# Patient Record
Sex: FEMALE | Race: WHITE | NOT HISPANIC OR LATINO | Employment: OTHER | URBAN - NONMETROPOLITAN AREA
[De-identification: names, ages, dates, MRNs, and addresses within clinical notes are randomized per-mention and may not be internally consistent; named-entity substitution may affect disease eponyms.]

---

## 2022-02-28 ENCOUNTER — HOSPITAL ENCOUNTER (OUTPATIENT)
Dept: ULTRASOUND IMAGING | Facility: HOSPITAL | Age: 67
Discharge: HOME | End: 2022-02-28
Admitting: NURSE PRACTITIONER

## 2022-02-28 ENCOUNTER — TRANSCRIBE ORDERS (OUTPATIENT)
Dept: ADMINISTRATIVE | Facility: HOSPITAL | Age: 67
End: 2022-02-28

## 2022-02-28 DIAGNOSIS — I82.402 ACUTE EMBOLISM AND THROMBOSIS OF DEEP VEIN OF LEFT LOWER EXTREMITY: ICD-10-CM

## 2022-02-28 DIAGNOSIS — I82.402 ACUTE EMBOLISM AND THROMBOSIS OF DEEP VEIN OF LEFT LOWER EXTREMITY: Primary | ICD-10-CM

## 2022-02-28 PROCEDURE — 93971 EXTREMITY STUDY: CPT

## 2022-03-25 ENCOUNTER — OFFICE VISIT (OUTPATIENT)
Dept: NEUROSURGERY | Age: 67
End: 2022-03-25
Payer: COMMERCIAL

## 2022-03-25 VITALS
WEIGHT: 170 LBS | BODY MASS INDEX: 29.02 KG/M2 | HEIGHT: 64 IN | DIASTOLIC BLOOD PRESSURE: 75 MMHG | SYSTOLIC BLOOD PRESSURE: 138 MMHG | HEART RATE: 80 BPM

## 2022-03-25 DIAGNOSIS — R42 LIGHTHEADEDNESS: ICD-10-CM

## 2022-03-25 DIAGNOSIS — G44.309 POST-CONCUSSION HEADACHE: Primary | ICD-10-CM

## 2022-03-25 PROCEDURE — 99204 OFFICE O/P NEW MOD 45 MIN: CPT | Performed by: NURSE PRACTITIONER

## 2022-03-25 PROCEDURE — 80305 DRUG TEST PRSMV DIR OPT OBS: CPT | Performed by: NURSE PRACTITIONER

## 2022-03-25 RX ORDER — OMEGA-3 FATTY ACIDS/FISH OIL 300-1000MG
1 CAPSULE ORAL
COMMUNITY

## 2022-03-25 RX ORDER — AZELASTINE 1 MG/ML
1 SPRAY, METERED NASAL 2 TIMES DAILY
COMMUNITY

## 2022-03-25 RX ORDER — MONTELUKAST SODIUM 10 MG/1
10 TABLET ORAL NIGHTLY
COMMUNITY

## 2022-03-25 RX ORDER — OMEPRAZOLE 20 MG/1
20 CAPSULE, DELAYED RELEASE ORAL DAILY
COMMUNITY

## 2022-03-25 RX ORDER — NORTRIPTYLINE HYDROCHLORIDE 25 MG/1
25 CAPSULE ORAL NIGHTLY
Qty: 30 CAPSULE | Refills: 3 | Status: SHIPPED | OUTPATIENT
Start: 2022-03-25 | End: 2022-05-25 | Stop reason: SINTOL

## 2022-03-25 RX ORDER — POTASSIUM CITRATE 10 MEQ/1
20 TABLET, EXTENDED RELEASE ORAL DAILY
COMMUNITY

## 2022-03-25 RX ORDER — LOSARTAN POTASSIUM AND HYDROCHLOROTHIAZIDE 12.5; 5 MG/1; MG/1
1 TABLET ORAL DAILY
COMMUNITY

## 2022-03-25 RX ORDER — ASPIRIN 81 MG/1
81 TABLET, CHEWABLE ORAL DAILY
COMMUNITY
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
## 2022-03-31 ENCOUNTER — TELEPHONE (OUTPATIENT)
Dept: NEUROSURGERY | Age: 67
End: 2022-03-31

## 2022-03-31 ENCOUNTER — TELEPHONE (OUTPATIENT)
Dept: NEUROLOGY | Age: 67
End: 2022-03-31

## 2022-03-31 ENCOUNTER — HOSPITAL ENCOUNTER (OUTPATIENT)
Dept: NON INVASIVE DIAGNOSTICS | Age: 67
Discharge: HOME | End: 2022-03-31
Payer: COMMERCIAL

## 2022-03-31 DIAGNOSIS — R93.1 ABNORMAL ECHOCARDIOGRAM: Primary | ICD-10-CM

## 2022-03-31 DIAGNOSIS — R42 LIGHTHEADEDNESS: ICD-10-CM

## 2022-03-31 LAB
LV EF: 58 %
LVEF MODALITY: NORMAL

## 2022-03-31 PROCEDURE — 93306 TTE W/DOPPLER COMPLETE: CPT

## 2022-03-31 PROCEDURE — 93246 EXT ECG>7D<15D RECORDING: CPT

## 2022-03-31 NOTE — TELEPHONE ENCOUNTER
Dmitry Estrada NP from [facility] called stating that the pt had an abnormal ECHO today and Dr. Ortiz Rodas is recommending a CT be ordered.

## 2022-03-31 NOTE — TELEPHONE ENCOUNTER
----- Message from NORA Ngo sent at 3/31/2022 12:50 PM CDT -----  CTA chest to r/o PE per cardiology. Defer cardiac MRI to cardiology, these are not done locally and if it is needed then should come from cardiology. Called pt explained her Echo was abnormal with a concern of thrombosis, CTA Chest was ordered per EG and scheduled for 4/1/2022 at 1520 with an arrival time of 1500. Instructed her to hold Metformin the day of procedure and fast 6 hours prior to check in time. Instructed pt to arrive at 1500 in outpatient registration with ID and insurance cards. Pt voiced understanding. Per Hope message pt asked if this was covered via [employer], I did explain it looks like the pip coverage is exhausted. Pt voiced understanding.

## 2022-04-01 ENCOUNTER — HOSPITAL ENCOUNTER (OUTPATIENT)
Dept: CT IMAGING | Age: 67
Discharge: HOME | End: 2022-04-01
Payer: COMMERCIAL

## 2022-04-01 DIAGNOSIS — R93.1 ABNORMAL ECHOCARDIOGRAM: ICD-10-CM

## 2022-04-01 LAB
GFR AFRICAN AMERICAN: >60
GFR NON-AFRICAN AMERICAN: >60
POC CREATININE: 0.9 MG/DL (ref 0.3–1.3)

## 2022-04-01 PROCEDURE — 71275 CT ANGIOGRAPHY CHEST: CPT

## 2022-04-01 PROCEDURE — 6360000004 HC RX CONTRAST MEDICATION: Performed by: NURSE PRACTITIONER

## 2022-04-01 PROCEDURE — 82565 ASSAY OF CREATININE: CPT

## 2022-04-01 RX ADMIN — IOPAMIDOL 80 ML: 755 INJECTION, SOLUTION INTRAVENOUS at 15:22

## 2022-04-11 ENCOUNTER — TELEPHONE (OUTPATIENT)
Dept: NEUROSURGERY | Age: 67
End: 2022-04-11

## 2022-04-11 DIAGNOSIS — R93.89 ABNORMAL COMPUTED TOMOGRAPHY ANGIOGRAPHY (CTA): Primary | ICD-10-CM

## 2022-04-12 ENCOUNTER — TELEPHONE (OUTPATIENT)
Dept: NEUROSURGERY | Age: 67
End: 2022-04-12

## 2022-04-12 ENCOUNTER — TELEPHONE (OUTPATIENT)
Dept: VASCULAR SURGERY | Age: 67
End: 2022-04-12

## 2022-04-12 NOTE — TELEPHONE ENCOUNTER
Fannie Trujillo requests that clinic return her call. The best time to reach her is Anytime. Fannie Trujillo would like to go over her test results and reasoning of why she is being referred to other specialists. Thank you.

## 2022-04-12 NOTE — TELEPHONE ENCOUNTER
Called patients about referrals. She voiced understanding to the results. However questioned if this is something that could wait a few months. She voiced her insurance is not the best and would like wait until September to continue with referrals as she will have medicare at that point. She stated if this was something that was urgent and needed to be seen asap she would do so but would prefer to wait until September if at all possible.

## 2022-04-12 NOTE — TELEPHONE ENCOUNTER
Arcadio Soni called to schedule a new pt appt. Please be advised that the best time to call her to accommodate their needs is Anytime. Thank you.

## 2022-04-12 NOTE — TELEPHONE ENCOUNTER
Why don't she talk with her PCP regarding the liver lesion- might be able to order an u/s to get a different look at it. We can hold off on that referral until September unless PCP feels otherwise. As far as the vascular referral, I'm not sure how significant the calcification is so I would prefer her to see vascular sooner rather later.

## 2022-04-14 PROCEDURE — 93248 EXT ECG>7D<15D REV&INTERPJ: CPT | Performed by: NURSE PRACTITIONER

## 2022-04-14 NOTE — PROCEDURES
[facility] Monitor Report      Christiano Villaseñor  [id number]    : 1955      Indications: irregular heart rate       Test Date:      Analysis Date:  2022    Date Interpreted: 2022        Nearly 8 days and 13 hours of rhythm are processed. 1. Sinus  BPM    2.  Rare VEs and SVEs    (copied from Dr. Makenna Gastelum handwritten report on Zio strips)    Electronically signed by NORA Mccoy on 22

## 2022-04-27 DIAGNOSIS — G43.009 MIGRAINE WITHOUT AURA AND WITHOUT STATUS MIGRAINOSUS, NOT INTRACTABLE: Primary | ICD-10-CM

## 2022-04-27 RX ORDER — RIMEGEPANT SULFATE 75 MG/75MG
TABLET, ORALLY DISINTEGRATING ORAL
Qty: 8 TABLET | Refills: 5 | Status: SHIPPED | OUTPATIENT
Start: 2022-04-27 | End: 2022-05-25 | Stop reason: SDUPTHER

## 2022-04-27 RX ORDER — BUTALBITAL, ACETAMINOPHEN AND CAFFEINE 50; 325; 40 MG/1; MG/1; MG/1
1 TABLET ORAL EVERY 6 HOURS PRN
Qty: 30 TABLET | Refills: 3 | Status: SHIPPED | OUTPATIENT
Start: 2022-04-27 | End: 2022-05-25 | Stop reason: SDUPTHER

## 2022-04-27 NOTE — TELEPHONE ENCOUNTER
Requested Prescriptions     Pending Prescriptions Disp Refills    Rimegepant Sulfate (NURTEC) 75 MG TBDP 8 tablet 5     Sig: Take 1 tablet as need at onset of migraine. Do not exceed 1 tablet in 24 hours.     butalbital-acetaminophen-caffeine (FIORICET, ESGIC) -40 MG per tablet 30 tablet 3     Sig: Take 1 tablet by mouth every 6 hours as needed for Migraine       Last Office Visit:  3/25/2022  Next Office Visit:  5/25/2022      *RX updated to reflect  04/27/2022  fill date*

## 2022-05-06 ENCOUNTER — TELEPHONE (OUTPATIENT)
Dept: NEUROLOGY | Age: 67
End: 2022-05-06
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
## 2022-05-25 ENCOUNTER — OFFICE VISIT (OUTPATIENT)
Dept: NEUROSURGERY | Age: 67
End: 2022-05-25
Payer: COMMERCIAL

## 2022-05-25 VITALS
DIASTOLIC BLOOD PRESSURE: 75 MMHG | SYSTOLIC BLOOD PRESSURE: 129 MMHG | TEMPERATURE: 97 F | HEIGHT: 64 IN | OXYGEN SATURATION: 97 % | HEART RATE: 72 BPM | BODY MASS INDEX: 29.18 KG/M2

## 2022-05-25 DIAGNOSIS — G43.009 MIGRAINE WITHOUT AURA AND WITHOUT STATUS MIGRAINOSUS, NOT INTRACTABLE: Primary | ICD-10-CM

## 2022-05-25 DIAGNOSIS — R93.1 ABNORMAL ECHOCARDIOGRAM: ICD-10-CM

## 2022-05-25 DIAGNOSIS — R93.89 ABNORMAL COMPUTED TOMOGRAPHY ANGIOGRAPHY (CTA): ICD-10-CM

## 2022-05-25 DIAGNOSIS — G44.309 POST-CONCUSSION HEADACHE: ICD-10-CM

## 2022-05-25 PROCEDURE — 99213 OFFICE O/P EST LOW 20 MIN: CPT | Performed by: NURSE PRACTITIONER

## 2022-05-25 RX ORDER — RIMEGEPANT SULFATE 75 MG/75MG
TABLET, ORALLY DISINTEGRATING ORAL
Qty: 8 TABLET | Refills: 5 | Status: SHIPPED | OUTPATIENT
Start: 2022-05-25 | End: 2022-11-01 | Stop reason: SDUPTHER

## 2022-05-25 RX ORDER — PREDNISOLONE ACETATE 10 MG/ML
SUSPENSION/ DROPS OPHTHALMIC
COMMUNITY
Start: 2022-05-10 | End: 2022-11-01

## 2022-05-25 RX ORDER — BUTALBITAL, ACETAMINOPHEN AND CAFFEINE 50; 325; 40 MG/1; MG/1; MG/1
1 TABLET ORAL EVERY 6 HOURS PRN
Qty: 30 TABLET | Refills: 0 | Status: SHIPPED | OUTPATIENT
Start: 2022-05-25 | End: 2022-08-01

## 2022-05-25 NOTE — TELEPHONE ENCOUNTER
Patient's , Vickey Albarran, returned call and gave the [facility] phone number of [phone]. Stated I would call Tyrell Yancey and see what was needed. Argeliamonserrat Avis voiced understanding.  sh

## 2022-05-25 NOTE — TELEPHONE ENCOUNTER
Called patients  at [phone] and spoke to Lesley Toro and stated that Zaina Mendoza gave me the information and stated that the patient was having trouble with billing and I asked if he would have the [address] phone number so I could call and see what he was needing. Megan Denise stated they were at [facility] and then number is in the car and he would have to call me back. Lizzie Nowak my direct office number.  sh
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
## 2022-05-25 NOTE — TELEPHONE ENCOUNTER
NORA Abdi gave me patients information and stated that the  had stated that they are having some billing/coding issues and would like to speak to me. Will look into this and call the [place] to see what is needed.  sh
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
## 2022-06-02 NOTE — TELEPHONE ENCOUNTER
Called patient's , Ricardo Antoine at [phone] and stated that I spoke with Parul Escamilla and he stated what the issue was with coding and that I had reached out to our coding/billing team to see about getting resolved on 5/31 and no response as of yet but sent another email just now to see if I can get an answer. Ricardo Antoine voiced understanding and thanked me for the call. Stated to Ricardo Antoine that I would continue to follow up.  sh
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
## 2022-06-16 NOTE — TELEPHONE ENCOUNTER
Still no update on this from the Customer Service Escalation Team.    Another email sent today with high priority for an update and copied my director Cayetano Jewell.  sh

## 2022-06-27 NOTE — TELEPHONE ENCOUNTER
Called patient's , Markos Garcia, called at [phone] and stated to Noemi Underwood that I received a message back stating that our billing office has corrected the claims to [facility] and resubmitted the bills. Explained to Noemi Underwood that [facility] should not  the claims as this is what Rm from [facility] stated previously and stated to Noemi Underwood if any more bills to please give me a call. My name and number given. Noemi Underwood voiced understanding.  sh

## 2022-08-01 DIAGNOSIS — G43.009 MIGRAINE WITHOUT AURA AND WITHOUT STATUS MIGRAINOSUS, NOT INTRACTABLE: ICD-10-CM

## 2022-08-01 RX ORDER — BUTALBITAL, ACETAMINOPHEN AND CAFFEINE 50; 325; 40 MG/1; MG/1; MG/1
1 TABLET ORAL EVERY 6 HOURS PRN
Qty: 30 TABLET | Refills: 0 | Status: SHIPPED | OUTPATIENT
Start: 2022-08-01 | End: 2022-11-01 | Stop reason: SDUPTHER

## 2022-08-01 NOTE — TELEPHONE ENCOUNTER
Requested Prescriptions     Pending Prescriptions Disp Refills    butalbital-acetaminophen-caffeine (FIORICET, ESGIC) -40 MG per tablet [Pharmacy Med Name: BUT/APAP/CAF] 30 tablet 0     Sig: TAKE 1 TABLET BY MOUTH EVERY 6 HOURS AS NEEDED FOR MIGRAINE       Last Office Visit:  5/25/2022  Next Office Visit:  8/25/2022  Last Medication Refill:  5/25/22  Parminder Vergara up to date:  8/1/22    *RX updated to reflect   8/1/22  fill date*

## 2022-11-01 ENCOUNTER — OFFICE VISIT (OUTPATIENT)
Dept: NEUROLOGY | Age: 67
End: 2022-11-01
Payer: COMMERCIAL

## 2022-11-01 VITALS
SYSTOLIC BLOOD PRESSURE: 143 MMHG | OXYGEN SATURATION: 97 % | HEART RATE: 82 BPM | BODY MASS INDEX: 29.02 KG/M2 | HEIGHT: 64 IN | DIASTOLIC BLOOD PRESSURE: 81 MMHG | WEIGHT: 170 LBS

## 2022-11-01 DIAGNOSIS — R93.5 ABNORMAL COMPUTED TOMOGRAPHY ANGIOGRAPHY (CTA) OF ABDOMEN: Primary | ICD-10-CM

## 2022-11-01 DIAGNOSIS — G43.009 MIGRAINE WITHOUT AURA AND WITHOUT STATUS MIGRAINOSUS, NOT INTRACTABLE: ICD-10-CM

## 2022-11-01 PROCEDURE — 99213 OFFICE O/P EST LOW 20 MIN: CPT | Performed by: NURSE PRACTITIONER

## 2022-11-01 PROCEDURE — 1123F ACP DISCUSS/DSCN MKR DOCD: CPT | Performed by: NURSE PRACTITIONER

## 2022-11-01 RX ORDER — BUTALBITAL, ACETAMINOPHEN AND CAFFEINE 50; 325; 40 MG/1; MG/1; MG/1
1 TABLET ORAL EVERY 6 HOURS PRN
Qty: 30 TABLET | Refills: 0 | Status: SHIPPED | OUTPATIENT
Start: 2022-11-01

## 2022-11-01 RX ORDER — ATOGEPANT 60 MG/1
60 TABLET ORAL DAILY
Qty: 30 TABLET | Refills: 3 | Status: SHIPPED | OUTPATIENT
Start: 2022-11-01

## 2022-11-01 RX ORDER — RIMEGEPANT SULFATE 75 MG/75MG
TABLET, ORALLY DISINTEGRATING ORAL
Qty: 8 TABLET | Refills: 5 | Status: SHIPPED | OUTPATIENT
Start: 2022-11-01
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
## 2022-11-04 ENCOUNTER — OFFICE VISIT (OUTPATIENT)
Dept: VASCULAR SURGERY | Age: 67
End: 2022-11-04
Payer: MEDICARE

## 2022-11-04 VITALS
DIASTOLIC BLOOD PRESSURE: 72 MMHG | OXYGEN SATURATION: 98 % | HEART RATE: 71 BPM | TEMPERATURE: 97.9 F | SYSTOLIC BLOOD PRESSURE: 122 MMHG

## 2022-11-04 DIAGNOSIS — I73.9 PVD (PERIPHERAL VASCULAR DISEASE) (HCC): Primary | ICD-10-CM

## 2022-11-04 PROCEDURE — G8417 CALC BMI ABV UP PARAM F/U: HCPCS | Performed by: NURSE PRACTITIONER

## 2022-11-04 PROCEDURE — 1123F ACP DISCUSS/DSCN MKR DOCD: CPT | Performed by: NURSE PRACTITIONER

## 2022-11-04 PROCEDURE — 1090F PRES/ABSN URINE INCON ASSESS: CPT | Performed by: NURSE PRACTITIONER

## 2022-11-04 PROCEDURE — G8428 CUR MEDS NOT DOCUMENT: HCPCS | Performed by: NURSE PRACTITIONER

## 2022-11-04 PROCEDURE — G8484 FLU IMMUNIZE NO ADMIN: HCPCS | Performed by: NURSE PRACTITIONER

## 2022-11-04 PROCEDURE — 99203 OFFICE O/P NEW LOW 30 MIN: CPT | Performed by: NURSE PRACTITIONER

## 2022-11-04 PROCEDURE — G8400 PT W/DXA NO RESULTS DOC: HCPCS | Performed by: NURSE PRACTITIONER

## 2022-11-04 PROCEDURE — 3017F COLORECTAL CA SCREEN DOC REV: CPT | Performed by: NURSE PRACTITIONER

## 2022-11-04 PROCEDURE — 1036F TOBACCO NON-USER: CPT | Performed by: NURSE PRACTITIONER

## 2022-11-04 NOTE — PROGRESS NOTES
Dalton Ferrell (:  1955) is a 74 y.o. female,Established patient, here for evaluation of the following chief complaint(s):  New Patient (New Pt; Ref by Carolin Anderson; Abnormal computed tomography angiography (CTA) of abdomen;)            SUBJECTIVE/OBJECTIVE:  Cassia Quispe has a history of peripheral vascular disease of the lower extremities. She has had this for 1 - 5 years. Current treatment includes ASA EC daily. Cassia Quispe has not had new wounds. Recently, she reports no significant claudication. Dalton Ferrell is a 74 y.o. female with the following history as recorded in [facility]:  Patient Active Problem List    Diagnosis Date Noted    Lightheadedness      Current Outpatient Medications   Medication Sig Dispense Refill    Atogepant (QULIPTA) 60 MG TABS Take 60 mg by mouth daily 30 tablet 3    butalbital-acetaminophen-caffeine (FIORICET, ESGIC) -40 MG per tablet Take 1 tablet by mouth every 6 hours as needed for Migraine 30 tablet 0    Rimegepant Sulfate (NURTEC) 75 MG TBDP Take 1 tablet as need at onset of migraine. Do not exceed 1 tablet in 24 hours. 8 tablet 5    omeprazole (PRILOSEC) 20 MG delayed release capsule Take 20 mg by mouth daily      losartan-hydroCHLOROthiazide (HYZAAR) 50-12.5 MG per tablet Take 1 tablet by mouth daily      metFORMIN (GLUCOPHAGE) 500 MG tablet Take 500 mg by mouth 2 times daily (with meals)      azelastine (ASTELIN) 0.1 % nasal spray 1 spray by Nasal route 2 times daily Use in each nostril as directed      montelukast (SINGULAIR) 10 MG tablet Take 10 mg by mouth nightly      aspirin 81 MG chewable tablet Take 81 mg by mouth daily      ibuprofen (ADVIL;MOTRIN) 200 MG CAPS Take 1 capsule by mouth      potassium citrate (UROCIT-K) 10 MEQ (1080 MG) extended release tablet Take 20 mEq by mouth daily       No current facility-administered medications for this visit.      Allergies: Codeine  Past Medical History:   Diagnosis Date    Headache     Low back pain Shingles 04/29/2022     Past Surgical History:   Procedure Laterality Date    LUMBAR DISCECTOMY  1998    SPINAL FUSION  [id number]     Family History   Problem Relation Age of Onset    Heart Disease Mother     Lupus Mother     High Cholesterol Mother     Diabetes Mother     Lung Disease Mother     Heart Disease Father      Social History     Tobacco Use    Smoking status: Never    Smokeless tobacco: Never   Substance Use Topics    Alcohol use: Not Currently       ROS  Eyes - no sudden vision change or amaurosis. Respiratory - no significant shortness of breath,  Cardiovascular - no chest pain or syncope. No  significant leg swelling.  minimal claudication. Musculoskeletal - no gait disturbance  Skin - no new wound. Neurologic -  No speech difficulty or lateralizing weakness. All other review of systems are negative. Physical Exam    /72 (Site: Right Upper Arm, Position: Sitting, Cuff Size: Medium Adult)   Pulse 71   Temp 97.9 °F (36.6 °C)   SpO2 98%       Neck- carotid pulses 2+ to palpation with no bruit  Cardiovascular - Regular rate and rhythm. Pulmonary - effort appears normal.  No respiratory distress. Lungs - Breath sounds normal. No wheezes or rales. Extremities -  - Radial and brachial pulses are 2+ to palpation bilaterally. Right femoral pulse: present 2+; Right popliteal pulse: absent Right DP: present 2+; Right PT absent; Left femoral pulse: present 2+; Left popliteal pulse: absent; Left DP: present 2+; Left PT: absent No cyanosis, clubbing, or significant edema. No signs atheroembolic event. Neurologic - alert and oriented X 3. Physiologic. Face symmetric. Skin - warm, dry, and intact.   no wound  Psychiatric - mood, affect, and behavior appear normal.  Judgment and thought processes appear normal.    Risk factors for atherosclerosis of all vascular beds have been reviewed with the patient including:  Family history, tobacco abuse in all forms, elevated cholesterol, hyperlipidemia, and diabetes. Ct -   1. No pulmonary emboli. 2. Moderate thoracoabdominal aortic calcification with mild ectasia of   ascending thoracic aorta. No dissection. Individual images were reviewed. Results were reviewed with the patient. No significant plaque identified    Reviewed on this visit: speciality care notes from neurology        ASSESSMENT/PLAN:  1. PVD (peripheral vascular disease) ([facility] [address]  1. PVD (peripheral vascular disease) ([facility] [address]         Discussed management of CT scan which includes:  continue asa to reduce risk of arterial thrombosis and to decrease rate of plaque buildup  Strongly encourage start/continue statin therapy -   Recommend no smoking - discussed the effect tobacco has on illness;   Proceed with follow up if she develops claudication. No significant plaque seen within calcification          Patient instructed to walk as much as possible. Call our office with any progressive pain in leg(s) or hip(s) with walking. Take good care of your feet. Let us know right away if you develop a wound on your foot. An electronic signature was used to authenticate this note.     --NORA Kincaid

## 2023-04-03 ENCOUNTER — CLINICAL DOCUMENTATION (OUTPATIENT)
Dept: NEUROLOGY | Age: 68
End: 2023-04-03

## 2023-04-03 NOTE — PROGRESS NOTES
VICTORIANO GRAVES [employer] Search - PA Case ID: [id number] help? Call us at [phone]  Outcome  Approvedtoday  [facility]:[id number];NICKO:ANAHI; Review Type:Prior Auth; Coverage Start Date:03/04/2023; Coverage End Date:04/02/2024;  Drug  Nurtec 75MG dispersible tablets  Form  [employer] Electronic PA Form (1771 NCPDP)

## 2024-03-04 ENCOUNTER — TELEPHONE (OUTPATIENT)
Dept: NEUROLOGY | Age: 69
End: 2024-03-04

## 2024-03-04 NOTE — TELEPHONE ENCOUNTER
VICTORIANO GRAVES (Key: [id number])  PA Case ID #: [id number]  Need Help? Call us at [phone]  Outcome  Approved today  CaseId:[id number];Status:Approved;Review Type:Prior Auth;Coverage Start Date:02/03/2024;Coverage End Date:03/04/2025;  Authorization Expiration Date: 3/3/2025  Drug  Nurtec 75MG dispersible tablets    Form  [employer] Electronic PA Form (2017 NCPDP)

## 2024-03-04 NOTE — TELEPHONE ENCOUNTER
VICTORIANO GRAVES (Key: [id number])  PA Case ID #: [id number]  Need Help? Call us at [phone]  Status  Sent to Plan today  Drug  Nurtec 75MG dispersible tablets    Form  [employer] Electronic PA Form (2017 NCPDP)